# Patient Record
Sex: FEMALE | Race: WHITE | NOT HISPANIC OR LATINO
[De-identification: names, ages, dates, MRNs, and addresses within clinical notes are randomized per-mention and may not be internally consistent; named-entity substitution may affect disease eponyms.]

---

## 2019-06-03 ENCOUNTER — APPOINTMENT (OUTPATIENT)
Dept: MRI IMAGING | Facility: CLINIC | Age: 24
End: 2019-06-03
Payer: COMMERCIAL

## 2019-06-03 ENCOUNTER — OUTPATIENT (OUTPATIENT)
Dept: OUTPATIENT SERVICES | Facility: HOSPITAL | Age: 24
LOS: 1 days | End: 2019-06-03
Payer: COMMERCIAL

## 2019-06-03 DIAGNOSIS — Z00.8 ENCOUNTER FOR OTHER GENERAL EXAMINATION: ICD-10-CM

## 2019-06-03 PROCEDURE — 73221 MRI JOINT UPR EXTREM W/O DYE: CPT | Mod: 26,RT

## 2019-06-03 PROCEDURE — 73221 MRI JOINT UPR EXTREM W/O DYE: CPT

## 2021-11-09 ENCOUNTER — EMERGENCY (EMERGENCY)
Facility: HOSPITAL | Age: 26
LOS: 0 days | Discharge: HOME | End: 2021-11-09
Attending: HOSPITALIST
Payer: MEDICAID

## 2021-11-09 VITALS
OXYGEN SATURATION: 95 % | RESPIRATION RATE: 18 BRPM | DIASTOLIC BLOOD PRESSURE: 75 MMHG | HEART RATE: 115 BPM | SYSTOLIC BLOOD PRESSURE: 114 MMHG | TEMPERATURE: 99 F

## 2021-11-09 VITALS — WEIGHT: 186.07 LBS | HEIGHT: 63 IN

## 2021-11-09 DIAGNOSIS — R09.81 NASAL CONGESTION: ICD-10-CM

## 2021-11-09 DIAGNOSIS — R51.9 HEADACHE, UNSPECIFIED: ICD-10-CM

## 2021-11-09 DIAGNOSIS — Z20.822 CONTACT WITH AND (SUSPECTED) EXPOSURE TO COVID-19: ICD-10-CM

## 2021-11-09 DIAGNOSIS — B34.9 VIRAL INFECTION, UNSPECIFIED: ICD-10-CM

## 2021-11-09 LAB
ALBUMIN SERPL ELPH-MCNC: 3.9 G/DL — SIGNIFICANT CHANGE UP (ref 3.3–5)
ALP SERPL-CCNC: 102 U/L — SIGNIFICANT CHANGE UP (ref 40–120)
ALT FLD-CCNC: 31 U/L — SIGNIFICANT CHANGE UP (ref 12–78)
ANION GAP SERPL CALC-SCNC: 6 MMOL/L — SIGNIFICANT CHANGE UP (ref 5–17)
AST SERPL-CCNC: 31 U/L — SIGNIFICANT CHANGE UP (ref 15–37)
BASOPHILS # BLD AUTO: 0.03 K/UL — SIGNIFICANT CHANGE UP (ref 0–0.2)
BASOPHILS NFR BLD AUTO: 0.3 % — SIGNIFICANT CHANGE UP (ref 0–2)
BILIRUB SERPL-MCNC: 0.7 MG/DL — SIGNIFICANT CHANGE UP (ref 0.2–1.2)
BUN SERPL-MCNC: 7 MG/DL — SIGNIFICANT CHANGE UP (ref 7–23)
CALCIUM SERPL-MCNC: 9 MG/DL — SIGNIFICANT CHANGE UP (ref 8.5–10.1)
CHLORIDE SERPL-SCNC: 107 MMOL/L — SIGNIFICANT CHANGE UP (ref 96–108)
CO2 SERPL-SCNC: 26 MMOL/L — SIGNIFICANT CHANGE UP (ref 22–31)
CREAT SERPL-MCNC: 0.66 MG/DL — SIGNIFICANT CHANGE UP (ref 0.5–1.3)
EOSINOPHIL # BLD AUTO: 0.21 K/UL — SIGNIFICANT CHANGE UP (ref 0–0.5)
EOSINOPHIL NFR BLD AUTO: 2.2 % — SIGNIFICANT CHANGE UP (ref 0–6)
FLUAV AG NPH QL: SIGNIFICANT CHANGE UP
FLUBV AG NPH QL: SIGNIFICANT CHANGE UP
GLUCOSE SERPL-MCNC: 95 MG/DL — SIGNIFICANT CHANGE UP (ref 70–99)
HCT VFR BLD CALC: 39.8 % — SIGNIFICANT CHANGE UP (ref 34.5–45)
HGB BLD-MCNC: 14.2 G/DL — SIGNIFICANT CHANGE UP (ref 11.5–15.5)
IMM GRANULOCYTES NFR BLD AUTO: 0.2 % — SIGNIFICANT CHANGE UP (ref 0–1.5)
LYMPHOCYTES # BLD AUTO: 2.79 K/UL — SIGNIFICANT CHANGE UP (ref 1–3.3)
LYMPHOCYTES # BLD AUTO: 29.3 % — SIGNIFICANT CHANGE UP (ref 13–44)
MCHC RBC-ENTMCNC: 31.3 PG — SIGNIFICANT CHANGE UP (ref 27–34)
MCHC RBC-ENTMCNC: 35.7 GM/DL — SIGNIFICANT CHANGE UP (ref 32–36)
MCV RBC AUTO: 87.7 FL — SIGNIFICANT CHANGE UP (ref 80–100)
MONOCYTES # BLD AUTO: 0.75 K/UL — SIGNIFICANT CHANGE UP (ref 0–0.9)
MONOCYTES NFR BLD AUTO: 7.9 % — SIGNIFICANT CHANGE UP (ref 2–14)
NEUTROPHILS # BLD AUTO: 5.71 K/UL — SIGNIFICANT CHANGE UP (ref 1.8–7.4)
NEUTROPHILS NFR BLD AUTO: 60.1 % — SIGNIFICANT CHANGE UP (ref 43–77)
PLATELET # BLD AUTO: 215 K/UL — SIGNIFICANT CHANGE UP (ref 150–400)
POTASSIUM SERPL-MCNC: 4.5 MMOL/L — SIGNIFICANT CHANGE UP (ref 3.5–5.3)
POTASSIUM SERPL-SCNC: 4.5 MMOL/L — SIGNIFICANT CHANGE UP (ref 3.5–5.3)
PROT SERPL-MCNC: 8.4 GM/DL — HIGH (ref 6–8.3)
RBC # BLD: 4.54 M/UL — SIGNIFICANT CHANGE UP (ref 3.8–5.2)
RBC # FLD: 11.6 % — SIGNIFICANT CHANGE UP (ref 10.3–14.5)
RSV RNA NPH QL NAA+NON-PROBE: SIGNIFICANT CHANGE UP
SARS-COV-2 RNA SPEC QL NAA+PROBE: SIGNIFICANT CHANGE UP
SODIUM SERPL-SCNC: 139 MMOL/L — SIGNIFICANT CHANGE UP (ref 135–145)
WBC # BLD: 9.51 K/UL — SIGNIFICANT CHANGE UP (ref 3.8–10.5)
WBC # FLD AUTO: 9.51 K/UL — SIGNIFICANT CHANGE UP (ref 3.8–10.5)

## 2021-11-09 PROCEDURE — 96374 THER/PROPH/DIAG INJ IV PUSH: CPT

## 2021-11-09 PROCEDURE — 99284 EMERGENCY DEPT VISIT MOD MDM: CPT

## 2021-11-09 PROCEDURE — 81025 URINE PREGNANCY TEST: CPT

## 2021-11-09 PROCEDURE — 36415 COLL VENOUS BLD VENIPUNCTURE: CPT

## 2021-11-09 PROCEDURE — 99284 EMERGENCY DEPT VISIT MOD MDM: CPT | Mod: 25

## 2021-11-09 PROCEDURE — 80053 COMPREHEN METABOLIC PANEL: CPT

## 2021-11-09 PROCEDURE — 85025 COMPLETE CBC W/AUTO DIFF WBC: CPT

## 2021-11-09 PROCEDURE — 0241U: CPT

## 2021-11-09 RX ORDER — SODIUM CHLORIDE 9 MG/ML
2000 INJECTION INTRAMUSCULAR; INTRAVENOUS; SUBCUTANEOUS ONCE
Refills: 0 | Status: COMPLETED | OUTPATIENT
Start: 2021-11-09 | End: 2021-11-09

## 2021-11-09 RX ORDER — KETOROLAC TROMETHAMINE 30 MG/ML
30 SYRINGE (ML) INJECTION ONCE
Refills: 0 | Status: DISCONTINUED | OUTPATIENT
Start: 2021-11-09 | End: 2021-11-09

## 2021-11-09 RX ADMIN — SODIUM CHLORIDE 2000 MILLILITER(S): 9 INJECTION INTRAMUSCULAR; INTRAVENOUS; SUBCUTANEOUS at 18:40

## 2021-11-09 RX ADMIN — Medication 30 MILLIGRAM(S): at 18:40

## 2021-11-09 NOTE — ED STATDOCS - CLINICAL SUMMARY MEDICAL DECISION MAKING FREE TEXT BOX
27 y/o F with viral syndrome. Will swab for COVID / flu. Basic lab, fluids, pain control. 25 y/o F with viral syndrome. Will swab for COVID / flu. Basic labs, fluids, pain control.

## 2021-11-09 NOTE — ED STATDOCS - ENMT, MLM
(+) congested. Nasal mucosa clear.  Mouth with normal mucosa  Throat has no vesicles, no oropharyngeal exudates and uvula is midline. (+) Congested. Nasal mucosa clear.  Mouth with normal mucosa  Throat has no vesicles, no oropharyngeal exudates and uvula is midline.

## 2021-11-09 NOTE — ED STATDOCS - PROGRESS NOTE DETAILS
27 y/o F with no PMH presents with HA, body aches, congestion x 10 days. +vaccination for covid. NO known sick contacts or travel. Denies fever, chills, nausea, vomiting, abdominal pain. PE: Well appearing. Cardiac: s1s2, RRR. Lungs: CTAB. ABdomen: NBS x4, soft, nontender. A/P: Likely viral syndrome. Will check labs including viral panel, toradol, expected dc home. - Irvin Su PA-C

## 2021-11-09 NOTE — ED ADULT TRIAGE NOTE - CHIEF COMPLAINT QUOTE
Pt comes to the ED complaining of fever, chills, headache, cough and generalized weakness x 1 week. Pt states that she has been taking advil and drinking theraflu tea with little improvement.

## 2021-11-09 NOTE — ED STATDOCS - NS_ ATTENDINGSCRIBEDETAILS _ED_A_ED_FT
Donna Syed MD: The history, relevant review of systems, past medical and surgical history, medical decision making, and physical examination was documented by the scribe in my presence and I attest to the accuracy of the documentation.

## 2021-11-09 NOTE — ED STATDOCS - PATIENT PORTAL LINK FT
You can access the FollowMyHealth Patient Portal offered by [facility] by registering at the following website: [email or web address]. By joining [employer]’s FollowMyHealth portal, you will also be able to view your health information using other applications (apps) compatible with our system.

## 2021-11-09 NOTE — ED STATDOCS - OBJECTIVE STATEMENT
27 y/o F with no significant PMHx presents to the ED   c/o HA, body aches, congestion x 10 days.  COVID vaccinated. No sick contact. No recent travel. Decreased PO intake. Denies N/V/D. 25 y/o F with no significant PMHx presents to the ED   c/o HA, body aches, congestion x 10 days.  COVID vaccinated. No sick contacts. No recent travel. Decreased PO intake. Denies N/V/D.

## 2023-03-13 PROBLEM — Z78.9 OTHER SPECIFIED HEALTH STATUS: Chronic | Status: ACTIVE | Noted: 2021-11-10

## 2023-03-22 ENCOUNTER — APPOINTMENT (OUTPATIENT)
Dept: NEUROLOGY | Facility: CLINIC | Age: 28
End: 2023-03-22
Payer: MEDICAID

## 2023-03-22 ENCOUNTER — NON-APPOINTMENT (OUTPATIENT)
Age: 28
End: 2023-03-22

## 2023-03-22 VITALS
HEART RATE: 79 BPM | WEIGHT: 208 LBS | HEIGHT: 64 IN | SYSTOLIC BLOOD PRESSURE: 109 MMHG | BODY MASS INDEX: 35.51 KG/M2 | DIASTOLIC BLOOD PRESSURE: 76 MMHG | TEMPERATURE: 97.8 F

## 2023-03-22 DIAGNOSIS — G43.019 MIGRAINE W/OUT AURA, INTRACTABLE, W/OUT STATUS MIGRAINOSUS: ICD-10-CM

## 2023-03-22 PROCEDURE — 99203 OFFICE O/P NEW LOW 30 MIN: CPT

## 2023-03-22 RX ORDER — RIZATRIPTAN BENZOATE 10 MG/1
10 TABLET ORAL
Qty: 20 | Refills: 6 | Status: ACTIVE | COMMUNITY
Start: 2023-03-22 | End: 1900-01-01

## 2023-03-22 RX ORDER — TOPIRAMATE 25 MG/1
25 TABLET, FILM COATED ORAL
Qty: 90 | Refills: 3 | Status: ACTIVE | COMMUNITY
Start: 2023-03-22 | End: 1900-01-01

## 2023-03-22 NOTE — PHYSICAL EXAM
[FreeTextEntry1] : BMI 35.7 /76 HR 79 [General Appearance - Alert] : alert [General Appearance - In No Acute Distress] : in no acute distress [Oriented To Time, Place, And Person] : oriented to person, place, and time [Affect] : the affect was normal [Mood] : the mood was normal [Cranial Nerves Optic (II)] : visual acuity intact bilaterally,  visual fields full to confrontation, pupils equal round and reactive to light [Cranial Nerves Oculomotor (III)] : extraocular motion intact [Cranial Nerves Trigeminal (V)] : facial sensation intact symmetrically [Cranial Nerves Facial (VII)] : face symmetrical [Cranial Nerves Vestibulocochlear (VIII)] : hearing was intact bilaterally [Cranial Nerves Glossopharyngeal (IX)] : tongue and palate midline [Cranial Nerves Accessory (XI - Cranial And Spinal)] : head turning and shoulder shrug symmetric [Cranial Nerves Hypoglossal (XII)] : there was no tongue deviation with protrusion [Motor Strength] : muscle strength was normal in all four extremities [Involuntary Movements] : no involuntary movements were seen [No Muscle Atrophy] : normal bulk in all four extremities [Sensation Tactile Decrease] : light touch was intact [Romberg's Sign] : Romberg's sign was negtive [Abnormal Walk] : normal gait [Balance] : balance was intact [Tremor] : no tremor present [2+] : Patella left 2+ [PERRL With Normal Accommodation] : pupils were equal in size, round, reactive to light, with normal accommodation [Extraocular Movements] : extraocular movements were intact [Hearing Threshold Finger Rub Not [place]] : hearing was normal [Neck Appearance] : the appearance of the neck was normal [Neck Cervical Mass (___cm)] : no neck mass was observed [Exaggerated Use Of Accessory Muscles For Inspiration] : no accessory muscle use [Heart Rate And Rhythm] : heart rate was normal and rhythm regular [Heart Sounds] : normal S1 and S2 [Abdomen Soft] : soft [] : no rash [Skin Lesions] : no skin lesions

## 2023-03-22 NOTE — REVIEW OF SYSTEMS
[Fever] : no fever [Feeling Tired] : not feeling tired [Confused or Disoriented] : no confusion [Memory Lapses or Loss] : no memory loss [Decr. Concentrating Ability] : no decrease in concentrating ability [Facial Weakness] : facial weakness [Arm Weakness] : arm weakness [Leg Weakness] : no leg weakness [Numbness] : numbness [Tingling] : tingling [Dizziness] : dizziness [Lightheadedness] : lightheadedness [Migraine Headache] : migraine headaches [Sleep Disturbances] : no sleep disturbances [Anxiety] : no anxiety [Depression] : no depression [Eye Pain] : eye pain [Loss Of Hearing] : no hearing loss [Heart Rate Is Fast] : the heart rate was not fast [Chest Pain] : no chest pain [Palpitations] : no palpitations [Shortness Of Breath] : no shortness of breath [Wheezing] : no wheezing [Abdominal Pain] : no abdominal pain [Vomiting] : no vomiting [Swollen Glands] : no swollen glands [Swollen Glands In The Neck] : no swollen glands in the neck

## 2023-03-22 NOTE — DISCUSSION/SUMMARY
[FreeTextEntry1] : 47-year-old female with with history of migraine headaches intractable without auras without status migrainous since 8 years old.  Patient last migraine attack experienced hemiparesis of the left face and upper extremity.\par \par #Intractable migraines without aura without status migrainous\par \par 1.  Have requested MRI of the brain to evaluate for strokes mass mass effect or any other contributing factors for long-term headaches\par 2.  Start rizatriptan 10 mg at onset may repeat in 2-hour intervals MDD 30 mg patient knows to call in for refills\par 3.  Start topiramate 25 mg nightly increase weekly by 25 mg until reaches 75 mg nightly and continue until follow-up\par 4.  Discussed that above medications are not compatible with pregnancy and need to use appropriate prevention\par 5.  Return to clinic in 3 months for follow-up evaluation

## 2023-03-22 NOTE — HISTORY OF PRESENT ILLNESS
[FreeTextEntry1] : 27-year-old female presents to clinic with complaints of left frontal headaches described as throbbing pain rated 5-10/10 she experiences 2-3 headaches per month with 3 to 4-day durations.  Associated symptoms include going to sleep and waking up with headaches left eye pain is described as a sharp pain scotomas floaters dizziness described as lightheadedness mild forgetfulness with headaches nausea vomiting triggers are alcohol and with her last headache she experienced that hemiparesis of the left face and upper extremity that has resolved.  She denies any blurred vision diplopia photophobia phonophobia osmophobia worsening severity frequency duration with menstrual cycle onset with sexual activity neck pain history of head and neck trauma cognitive dysfunction mood changes or irritability and auras.  Patient has been taking Tylenol without effect.  Past medical history is positive for obesity.  Past surgical history is negative.  Current medications are none and patient states no known drug allergies.  Social history is negative for alcohol drugs tobacco.  Patient works in a restaurant.  She is single lives with boyfriend and 2 children 6 and 12 years old.  Family history is negative for any significant contributions.

## 2023-03-31 ENCOUNTER — EMERGENCY (EMERGENCY)
Facility: HOSPITAL | Age: 28
LOS: 0 days | Discharge: HOME | End: 2023-03-31
Attending: EMERGENCY MEDICINE
Payer: MEDICAID

## 2023-03-31 VITALS
OXYGEN SATURATION: 96 % | RESPIRATION RATE: 18 BRPM | DIASTOLIC BLOOD PRESSURE: 75 MMHG | SYSTOLIC BLOOD PRESSURE: 125 MMHG | TEMPERATURE: 99 F | HEART RATE: 84 BPM

## 2023-03-31 DIAGNOSIS — G43.909 MIGRAINE, UNSPECIFIED, NOT INTRACTABLE, WITHOUT STATUS MIGRAINOSUS: ICD-10-CM

## 2023-03-31 DIAGNOSIS — R11.0 NAUSEA: ICD-10-CM

## 2023-03-31 DIAGNOSIS — R51.9 HEADACHE, UNSPECIFIED: ICD-10-CM

## 2023-03-31 LAB
ANION GAP SERPL CALC-SCNC: 4 MMOL/L — LOW (ref 5–17)
BASOPHILS # BLD AUTO: 0.02 K/UL — SIGNIFICANT CHANGE UP (ref 0–0.2)
BASOPHILS NFR BLD AUTO: 0.2 % — SIGNIFICANT CHANGE UP (ref 0–2)
BUN SERPL-MCNC: 10 MG/DL — SIGNIFICANT CHANGE UP (ref 7–23)
CALCIUM SERPL-MCNC: 9.2 MG/DL — SIGNIFICANT CHANGE UP (ref 8.5–10.1)
CHLORIDE SERPL-SCNC: 108 MMOL/L — SIGNIFICANT CHANGE UP (ref 96–108)
CO2 SERPL-SCNC: 26 MMOL/L — SIGNIFICANT CHANGE UP (ref 22–31)
CREAT SERPL-MCNC: 0.49 MG/DL — LOW (ref 0.5–1.3)
EGFR: 131 ML/MIN/1.73M2 — SIGNIFICANT CHANGE UP
EOSINOPHIL # BLD AUTO: 0.08 K/UL — SIGNIFICANT CHANGE UP (ref 0–0.5)
EOSINOPHIL NFR BLD AUTO: 0.9 % — SIGNIFICANT CHANGE UP (ref 0–6)
GLUCOSE SERPL-MCNC: 107 MG/DL — HIGH (ref 70–99)
HCG SERPL-ACNC: <1 MIU/ML — SIGNIFICANT CHANGE UP
HCT VFR BLD CALC: 39.1 % — SIGNIFICANT CHANGE UP (ref 34.5–45)
HGB BLD-MCNC: 13.5 G/DL — SIGNIFICANT CHANGE UP (ref 11.5–15.5)
IMM GRANULOCYTES NFR BLD AUTO: 0.5 % — SIGNIFICANT CHANGE UP (ref 0–0.9)
LYMPHOCYTES # BLD AUTO: 2.37 K/UL — SIGNIFICANT CHANGE UP (ref 1–3.3)
LYMPHOCYTES # BLD AUTO: 27.7 % — SIGNIFICANT CHANGE UP (ref 13–44)
MCHC RBC-ENTMCNC: 29.9 PG — SIGNIFICANT CHANGE UP (ref 27–34)
MCHC RBC-ENTMCNC: 34.5 GM/DL — SIGNIFICANT CHANGE UP (ref 32–36)
MCV RBC AUTO: 86.7 FL — SIGNIFICANT CHANGE UP (ref 80–100)
MONOCYTES # BLD AUTO: 0.44 K/UL — SIGNIFICANT CHANGE UP (ref 0–0.9)
MONOCYTES NFR BLD AUTO: 5.1 % — SIGNIFICANT CHANGE UP (ref 2–14)
NEUTROPHILS # BLD AUTO: 5.6 K/UL — SIGNIFICANT CHANGE UP (ref 1.8–7.4)
NEUTROPHILS NFR BLD AUTO: 65.6 % — SIGNIFICANT CHANGE UP (ref 43–77)
PLATELET # BLD AUTO: 235 K/UL — SIGNIFICANT CHANGE UP (ref 150–400)
POTASSIUM SERPL-MCNC: 3.8 MMOL/L — SIGNIFICANT CHANGE UP (ref 3.5–5.3)
POTASSIUM SERPL-SCNC: 3.8 MMOL/L — SIGNIFICANT CHANGE UP (ref 3.5–5.3)
RBC # BLD: 4.51 M/UL — SIGNIFICANT CHANGE UP (ref 3.8–5.2)
RBC # FLD: 12 % — SIGNIFICANT CHANGE UP (ref 10.3–14.5)
SODIUM SERPL-SCNC: 138 MMOL/L — SIGNIFICANT CHANGE UP (ref 135–145)
WBC # BLD: 8.55 K/UL — SIGNIFICANT CHANGE UP (ref 3.8–10.5)
WBC # FLD AUTO: 8.55 K/UL — SIGNIFICANT CHANGE UP (ref 3.8–10.5)

## 2023-03-31 PROCEDURE — 96375 TX/PRO/DX INJ NEW DRUG ADDON: CPT

## 2023-03-31 PROCEDURE — 99284 EMERGENCY DEPT VISIT MOD MDM: CPT

## 2023-03-31 PROCEDURE — 36415 COLL VENOUS BLD VENIPUNCTURE: CPT

## 2023-03-31 PROCEDURE — 99284 EMERGENCY DEPT VISIT MOD MDM: CPT | Mod: 25

## 2023-03-31 PROCEDURE — 80048 BASIC METABOLIC PNL TOTAL CA: CPT

## 2023-03-31 PROCEDURE — 85025 COMPLETE CBC W/AUTO DIFF WBC: CPT

## 2023-03-31 PROCEDURE — 84702 CHORIONIC GONADOTROPIN TEST: CPT

## 2023-03-31 PROCEDURE — 96374 THER/PROPH/DIAG INJ IV PUSH: CPT

## 2023-03-31 RX ORDER — METOCLOPRAMIDE HCL 10 MG
10 TABLET ORAL ONCE
Refills: 0 | Status: COMPLETED | OUTPATIENT
Start: 2023-03-31 | End: 2023-03-31

## 2023-03-31 RX ORDER — SODIUM CHLORIDE 9 MG/ML
1000 INJECTION INTRAMUSCULAR; INTRAVENOUS; SUBCUTANEOUS ONCE
Refills: 0 | Status: COMPLETED | OUTPATIENT
Start: 2023-03-31 | End: 2023-03-31

## 2023-03-31 RX ORDER — KETOROLAC TROMETHAMINE 30 MG/ML
30 SYRINGE (ML) INJECTION ONCE
Refills: 0 | Status: DISCONTINUED | OUTPATIENT
Start: 2023-03-31 | End: 2023-03-31

## 2023-03-31 RX ADMIN — SODIUM CHLORIDE 1000 MILLILITER(S): 9 INJECTION INTRAMUSCULAR; INTRAVENOUS; SUBCUTANEOUS at 13:19

## 2023-03-31 RX ADMIN — Medication 30 MILLIGRAM(S): at 13:18

## 2023-03-31 RX ADMIN — Medication 10 MILLIGRAM(S): at 13:18

## 2023-03-31 NOTE — ED STATDOCS - ATTENDING APP SHARED VISIT CONTRIBUTION OF CARE
I,Nakul Lepe MD,  performed the initial face to face bedside interview with this patient regarding history of present illness, review of symptoms and relevant past medical, social and family history.  I completed an independent physical examination.  I was the initial provider who evaluated this patient. I have signed out the follow up of any pending tests (i.e. labs, radiological studies) to the ACP.  I have communicated the patient’s plan of care and disposition with the ACP.  The history, relevant review of systems, past medical and surgical history, medical decision making, and physical examination was documented by the scribe in my presence and I attest to the accuracy of the documentation.

## 2023-03-31 NOTE — ED ADULT TRIAGE NOTE - CHIEF COMPLAINT QUOTE
Pt arrives to ED complaining of headache with nausea and vomiting for three days. denies medical history.

## 2023-03-31 NOTE — ED STATDOCS - PROGRESS NOTE DETAILS
Patient seen and evaluated, ED attending note and orders reviewed, will continue with patient follow up and care -Criss Azul PA-C labs WNL, pt feeling better after meds, tolerating PO, HA resolved will dc home with PMD f/u, return precautions given  Criss Azul PA-C

## 2023-03-31 NOTE — ED STATDOCS - OBJECTIVE STATEMENT
28 y/o female w/ a PMHx of migraines presents to the ED c/o HA and nausea x3 days. Pt states she has a hx of migraines but this pain is currently different and not alleviated by her normal migraine meds, states pain has been persistent and now assoc w/ nausea. No other complaints at this time.

## 2023-03-31 NOTE — ED ADULT NURSE NOTE - OBJECTIVE STATEMENT
Pt arrives to ED complaining of headache with nausea and vomiting for three days. denies medical history. pt takes migraine medication at home. Pt A&ox4, no s/s of distress.

## 2023-03-31 NOTE — ED STATDOCS - NSFOLLOWUPINSTRUCTIONS_ED_ALL_ED_FT
Dolor de callie nisha    LO QUE NECESITA SABER:    ¿Qué es un dolor de callie nisha?El dolor de callie nisha es un dolor o emily molestia que puede empezar de repente y empeorar rápidamente. Usted puede tener un dolor de callie nisha sólo cuando siente estrés o come ciertos alimentos. Otro tipo dolor de callie nisha puede producirse todos los días y a veces varias veces al día.    ¿Cuáles son los tipos más comunes de dolor de callie nisha?    El dolor de callie tensionales el tipo de dolor de callie más común. Normalmente aparece por la tarde y se va al atardecer. El dolor generalmente es leve o moderado. La tia brillante o el ruido magaly podrían estorbarle. Generalmente el dolor se encuentra a través de la frente o en la parte posterior de la callie y con frecuencia sólo en un lado. Connie dolor de callie podría ocurrir todos los matt.    Las migrañasprovocan dolor de moderado a intenso. El dolor de callie dura de 1 a 3 días generalmente y muchas veces es recurrente. El dolor tiende a estar sólo en un lado garland puede cambiar de lado. La migraña se localiza en la sien o en la parte posterior de la callie o del timi. El dolor podría pulsar o estar nisha y continuo.    Emily migraña con aurasignifica que usted ve o siente algo antes de emily migraña. Podría patric emily pequeña nancy rodeada de líneas brillantes en zigzag. Otros signos o síntomas podrían seguir al aura.    El dolor de callie en racimose siente solamente en un lado. A menudo causa dolor severo y puede durar de 30 minutos a 2 horas. Connie dolor de callie podría ocurrir 1 vez o 2 veces al día, a menudo a la noche. El dolor puede despertarlo.  ¿Qué causa el dolor de callie nisha?La causa de weller dolor de callie podría ser desconocida. Las siguientes condiciones pueden desencadenar un dolor de callie:    Estrés o tensión, horas o incluso días después de experimentar un hecho estresante.    Fatiga, falta de sueño o cambios en weller patrón de sueño habitual, o taylor emily siesta travis el día    Menstruación, especialmente después del embarazo o del uso de píldoras anticonceptivas o terapia de reemplazo hormonal    Alimentos gage embutidos, edulcorantes artificiales, alcohol, chocolate oscuro y el GMS    Repentinamente no contar con cafeína si por lo general consume cantidades elevadas    Un problema médico, gage emily infección, dolor de diente, dolor de gabriella o de senos paranasales, problemas de tiroides o un tumor    Un traumatismo craneal  ¿Cómo se diagnostica y trata el tipo de dolor de callie nisha?El médico le pedirá que describa weller dolor y que lo califique en emily escala de 1 a 10. Infórmele con qué frecuencia tiene karly de callie y cuánto tiempo aldrich. También se describen otros síntomas concomitantes con los karly de callie, gage los mareos o la visión borrosa. Es posible que haya que hacerle pruebas, gage emily tomografía computarizada, para asegurarse de que no hay emily fuga en ningún vaso sanguíneo.    Los medicamentosse puedes administrar para controlar o prevenir el dolor de callie. El medicamento dependerá del tipo de dolor de callie nisha que tenga. No espere a que el dolor sea muy intenso para taylor el medicamento. Puede taylor analgésicos sin prescripción médica, según sea necesario. Los ejemplos de éstos incluyen los MAMI y el acetaminofén. Consulte con weller médico cuál medicamento es el adecuado para usted. Pregunte cuánto taylor y cuándo. Siga las indicaciones. Estos medicamentos pueden causar sangrado abdominal o problemas renales si no se los brina correctamente.    La biorretroalimentaciónpodría usarse para ayudarlo a controlar weller estrés. Los electrodos (alambres) son colocados en weller cuerpo y conectados a un monitor. Usted aprenderá a cambiar la forma que weller cuerpo reacciona al estrés. Por ejemplo, usted aprende a disminuir weller latido cardíaco cuando se molesta.    La terapia cognitivo conductual,o el manejo del estrés pueden ser utilizadas junto con otras terapias para prevenir karly de callie.  ¿Qué puedo hacer para manejar mis síntomas?    Aplique hielo o caloren la hillary donde weller hijo siente el dolor de callie. Utilice un paquete (compresa) de hielo o calor. Para un paquete de hielo, también puede colocar hielo molido en emily bolsa plástica. Cubra el paquete de hielo o la bolsa con emily toalla pequeña antes de aplicarla en la piel. Tanto el hielo gage el calor ayudan a reducir el dolor, y el calor también contribuye a reducir los espasmos musculares. Aplique calor travis 20 a 30 minutos cada 2 horas. Aplique hielo travis 15 a 20 minutos cada hora. Aplique calor o hielo travis el tiempo y la cantidad de días que se le indique. Usted puede alternar el calor y el hielo.    Relaje rona músculos.Acuéstese en emily posición cómoda y cierre rona ojos. Relaje rona músculos lentamente. Comience por los dedos de los pies y avance hacia arriba al laurence de weller cuerpo.    Registre en un diario rona karly de callie.Escriba cuándo comienzan y terminan rona migrañas. Incluya rona síntomas y qué estaba haciendo cuando comenzó la migraña. Registre lo que comió y lo que tomó las 24 horas previas al comienzo de weller migraña. Describa el dolor y dónde le duele: Lleve un registro de lo que hizo para tratar weller migraña y si obtuvo un resultado satisfactorio.  ¿Qué puedo hacer para evitar un dolor de callie nisha?    Evite cualquier cosa que provoque un dolor de callie nisha.Los ejemplos incluyen la exposición a sustancias químicas, las grandes altitudes o no dormir lo suficiente. Jeanne emily rutina para dormir. Acuéstese y levántese todos los días a la misma hora. No utilice aparatos electrónicos antes de acostarse. Pueden provocarle un dolor de callie o impedirle dormir venancio.    No fume.La nicotina y otras sustancias químicas en los cigarrillos y puros pueden desencadenar un dolor de callie nisha o empeorarlo. Pida información a weller médico si usted actualmente fuma y necesita ayuda para dejar de fumar. Los cigarrillos electrónicos o el tabaco sin humo igualmente contienen nicotina. Consulte con weller médico antes de utilizar estos productos.    Limite el consumo de alcohol según le indicaron.El alcohol puede provocar un dolor de callie nisha o empeorarlo. Si usted tiene karly de callie de racimo, no man alcohol travis un episodio. Para otros tipos de karly de callie, pregúntele a weller proveedor de atención médica si es seguro para usted beber alcohol. Pregunte cuál es la cantidad grace que puede beber y con qué frecuencia.    Ejercítese según indicaciones.El ejercicio puede reducir la tensión y ayudarlo a aliviar el dolor de callie. Propóngase hacer 30 minutos de actividad física fabricio todos los días de la semana. Weller médico puede ayudarle a crear un plan de ejercicios.    Consuma alimentos saludables y variados.Los alimentos saludables incluyen las frutas, verduras, productos lácteos bajos en grasa, rogelio magras, pescado y frijoles cocidos. Weller médico o dietista puede ayudarle a crear planes de comidas si desea evitar los alimentos que provocan karly de callie.  ¿Cuándo ese buscar atención inmediata?    Usted tiene dolor intenso.    Usted tiene entumecimiento en un lado de weller damari o cuerpo.    Usted tiene un dolor de callie que ocurre después de un golpe en la callie, emily caída u otro trauma.    Tiene dolor de callie, está olvidadizo o confundido o tiene dificultad para hablar.    Tiene dolor de callie, rigidez en el gabriella y fiebre.  ¿Cuándo ese llamar a mi médico?    Usted tiene un dolor de callie dannie y está vomitando.    Usted tiene dolor de callie todos los días y no se samanta aun después de tratarlo.    Rona karly de callie cambian u ocurren nuevos síntomas cuando tiene dolor de callie.    Usted tiene preguntas o inquietudes acerca de weller condición o cuidado.  ACUERDOS SOBRE WELLER CUIDADO:    Usted tiene el derecho de ayudar a planear weller cuidado. Aprenda todo lo que pueda sobre weller condición y gage darle tratamiento. Discuta rona opciones de tratamiento con rona médicos para decidir el cuidado que usted desea recibir. Usted siempre tiene el derecho de rechazar el tratamiento.

## 2023-04-02 ENCOUNTER — RESULT REVIEW (OUTPATIENT)
Age: 28
End: 2023-04-02

## 2023-04-02 ENCOUNTER — OUTPATIENT (OUTPATIENT)
Dept: OUTPATIENT SERVICES | Facility: HOSPITAL | Age: 28
LOS: 1 days | End: 2023-04-02
Payer: MEDICAID

## 2023-04-02 ENCOUNTER — APPOINTMENT (OUTPATIENT)
Dept: MRI IMAGING | Facility: CLINIC | Age: 28
End: 2023-04-02
Payer: MEDICAID

## 2023-04-02 DIAGNOSIS — G43.019 MIGRAINE WITHOUT AURA, INTRACTABLE, WITHOUT STATUS MIGRAINOSUS: ICD-10-CM

## 2023-04-02 PROCEDURE — 70551 MRI BRAIN STEM W/O DYE: CPT | Mod: 26

## 2023-04-02 PROCEDURE — 70551 MRI BRAIN STEM W/O DYE: CPT

## 2023-07-31 ENCOUNTER — APPOINTMENT (OUTPATIENT)
Dept: NEUROLOGY | Facility: CLINIC | Age: 28
End: 2023-07-31

## 2024-02-28 NOTE — ED STATDOCS - NS ED ATTENDING STATEMENT MOD
Check xray of coccyx   Advised pt to start prednisone taper, prescribed from urgent care, and continue PRN flexeril  Referral to spine specialists for management   This was a shared visit with the FILEMON. I reviewed and verified the documentation and independently performed the documented:

## 2024-03-30 NOTE — ED STATDOCS - PATIENT PORTAL LINK FT
No indicators present
You can access the FollowMyHealth Patient Portal offered by [facility] by registering at the following website: [email or web address]. By joining [employer]’s FollowMyHealth portal, you will also be able to view your health information using other applications (apps) compatible with our system.

## 2024-04-02 ENCOUNTER — NON-APPOINTMENT (OUTPATIENT)
Age: 29
End: 2024-04-02

## 2024-04-22 ENCOUNTER — NON-APPOINTMENT (OUTPATIENT)
Age: 29
End: 2024-04-22